# Patient Record
Sex: MALE | Race: WHITE | Employment: FULL TIME | URBAN - METROPOLITAN AREA
[De-identification: names, ages, dates, MRNs, and addresses within clinical notes are randomized per-mention and may not be internally consistent; named-entity substitution may affect disease eponyms.]

---

## 2020-01-10 ENCOUNTER — HOSPITAL ENCOUNTER (EMERGENCY)
Age: 55
Discharge: HOME | End: 2020-01-10
Attending: EMERGENCY MEDICINE
Payer: OTHER MISCELLANEOUS

## 2020-01-10 VITALS
WEIGHT: 220 LBS | SYSTOLIC BLOOD PRESSURE: 159 MMHG | TEMPERATURE: 97.9 F | HEART RATE: 59 BPM | BODY MASS INDEX: 28.23 KG/M2 | HEIGHT: 74 IN | DIASTOLIC BLOOD PRESSURE: 92 MMHG | RESPIRATION RATE: 14 BRPM | OXYGEN SATURATION: 100 %

## 2020-01-10 PROCEDURE — 99284 EMERGENCY DEPT VISIT MOD MDM: CPT

## 2020-01-10 RX ORDER — ROSUVASTATIN CALCIUM 10 MG/1
10 TABLET, COATED ORAL DAILY
COMMUNITY
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
## 2020-01-10 NOTE — ED NOTES
Patient given discharge instructions verbal and written, patient verbalized understanding. Alert/oriented X4, Clear speech.   Patient exhibits no distress, ambulates with steady gait per self leaving unit, no further request.     Helena Krueger RN  01/10/20 0425
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
## 2021-11-04 ENCOUNTER — OFFICE VISIT (OUTPATIENT)
Dept: URGENT CARE | Facility: CLINIC | Age: 56
End: 2021-11-04
Payer: COMMERCIAL

## 2021-11-04 ENCOUNTER — HOSPITAL ENCOUNTER (EMERGENCY)
Facility: HOSPITAL | Age: 56
Discharge: HOME | End: 2021-11-04
Attending: STUDENT IN AN ORGANIZED HEALTH CARE EDUCATION/TRAINING PROGRAM
Payer: COMMERCIAL

## 2021-11-04 ENCOUNTER — APPOINTMENT (OUTPATIENT)
Dept: CT IMAGING | Facility: HOSPITAL | Age: 56
End: 2021-11-04
Payer: COMMERCIAL

## 2021-11-04 VITALS
SYSTOLIC BLOOD PRESSURE: 140 MMHG | HEART RATE: 77 BPM | RESPIRATION RATE: 20 BRPM | DIASTOLIC BLOOD PRESSURE: 70 MMHG | TEMPERATURE: 99.8 F | OXYGEN SATURATION: 95 %

## 2021-11-04 VITALS
WEIGHT: 220 LBS | DIASTOLIC BLOOD PRESSURE: 83 MMHG | HEART RATE: 71 BPM | SYSTOLIC BLOOD PRESSURE: 141 MMHG | HEIGHT: 74 IN | RESPIRATION RATE: 20 BRPM | OXYGEN SATURATION: 94 % | TEMPERATURE: 97.8 F | BODY MASS INDEX: 28.23 KG/M2

## 2021-11-04 DIAGNOSIS — R10.9 ABDOMINAL PAIN, UNSPECIFIED ABDOMINAL LOCATION: Primary | ICD-10-CM

## 2021-11-04 DIAGNOSIS — K57.92 DIVERTICULITIS: Primary | ICD-10-CM

## 2021-11-04 LAB
ALBUMIN SERPL-MCNC: 4.8 G/DL (ref 3.5–5.3)
ALP SERPL-CCNC: 88 U/L (ref 45–115)
ALT SERPL-CCNC: 27 U/L (ref 7–52)
ANION GAP SERPL CALC-SCNC: 12 MMOL/L (ref 3–11)
AST SERPL-CCNC: 16 U/L
BACTERIA #/AREA URNS HPF: NORMAL /HPF
BASOPHILS # BLD AUTO: 0 10*3/UL
BASOPHILS NFR BLD AUTO: 0 % (ref 0–2)
BILIRUB SERPL-MCNC: 1.75 MG/DL (ref 0.2–1.4)
BILIRUB UR QL: NEGATIVE
BUN SERPL-MCNC: 12 MG/DL (ref 7–25)
CALCIUM ALBUM COR SERPL-MCNC: 9.5 MG/DL (ref 8.6–10.3)
CALCIUM SERPL-MCNC: 10.1 MG/DL (ref 8.6–10.3)
CHLORIDE SERPL-SCNC: 100 MMOL/L (ref 98–107)
CLARITY UR: CLEAR
CO2 SERPL-SCNC: 25 MMOL/L (ref 21–32)
COLOR UR: YELLOW
CREAT SERPL-MCNC: 0.89 MG/DL (ref 0.7–1.3)
EOSINOPHIL # BLD AUTO: 0 10*3/UL
EOSINOPHIL NFR BLD AUTO: 0 % (ref 0–3)
ERYTHROCYTE [DISTWIDTH] IN BLOOD BY AUTOMATED COUNT: 14.2 % (ref 11.5–15)
GFR SERPL CREATININE-BSD FRML MDRD: 94 ML/MIN/1.73M*2
GLUCOSE SERPL-MCNC: 106 MG/DL (ref 70–105)
GLUCOSE UR QL: NEGATIVE MG/DL
HCT VFR BLD AUTO: 45.3 % (ref 38–50)
HGB BLD-MCNC: 15.5 G/DL (ref 13.2–17.2)
HGB UR QL: ABNORMAL
KETONES UR-MCNC: 15 MG/DL
LEUKOCYTE ESTERASE UR QL STRIP: NEGATIVE
LYMPHOCYTES # BLD AUTO: 1.4 10*3/UL
LYMPHOCYTES NFR BLD AUTO: 10 % (ref 15–47)
MCH RBC QN AUTO: 29.5 PG (ref 29–34)
MCHC RBC AUTO-ENTMCNC: 34.3 G/DL (ref 32–36)
MCV RBC AUTO: 86 FL (ref 82–97)
MONOCYTES # BLD AUTO: 1.6 10*3/UL
MONOCYTES NFR BLD AUTO: 12 % (ref 5–13)
NEUTROPHILS # BLD AUTO: 11 10*3/UL
NEUTROPHILS NFR BLD AUTO: 78 % (ref 46–70)
NITRITE UR QL: NEGATIVE
PH UR: 5.5 PH
PLATELET # BLD AUTO: 154 10*3/UL (ref 130–350)
PMV BLD AUTO: 9.2 FL (ref 6.9–10.8)
POTASSIUM SERPL-SCNC: 3.8 MMOL/L (ref 3.5–5.1)
PROT SERPL-MCNC: 7.9 G/DL (ref 6–8.3)
PROT UR STRIP-MCNC: NEGATIVE MG/DL
RBC # BLD AUTO: 5.26 10*6/ΜL (ref 4.1–5.8)
RBC #/AREA URNS HPF: NORMAL /HPF
SODIUM SERPL-SCNC: 137 MMOL/L (ref 135–145)
SP GR UR: 1.01 (ref 1–1.03)
SQUAMOUS #/AREA URNS HPF: NORMAL /HPF
UROBILINOGEN UR-MCNC: 0.2 E.U./DL
WBC # BLD AUTO: 14.2 10*3/UL (ref 3.7–9.6)
WBC #/AREA URNS HPF: NORMAL /HPF

## 2021-11-04 PROCEDURE — 2550000100 HC RX 255: Performed by: STUDENT IN AN ORGANIZED HEALTH CARE EDUCATION/TRAINING PROGRAM

## 2021-11-04 PROCEDURE — 99284 EMERGENCY DEPT VISIT MOD MDM: CPT | Performed by: STUDENT IN AN ORGANIZED HEALTH CARE EDUCATION/TRAINING PROGRAM

## 2021-11-04 PROCEDURE — 2580000300 HC RX 258: Performed by: STUDENT IN AN ORGANIZED HEALTH CARE EDUCATION/TRAINING PROGRAM

## 2021-11-04 PROCEDURE — 74177 CT ABD & PELVIS W/CONTRAST: CPT | Mod: ME

## 2021-11-04 PROCEDURE — 36415 COLL VENOUS BLD VENIPUNCTURE: CPT | Performed by: STUDENT IN AN ORGANIZED HEALTH CARE EDUCATION/TRAINING PROGRAM

## 2021-11-04 PROCEDURE — 80053 COMPREHEN METABOLIC PANEL: CPT | Performed by: STUDENT IN AN ORGANIZED HEALTH CARE EDUCATION/TRAINING PROGRAM

## 2021-11-04 PROCEDURE — G1004 CDSM NDSC: HCPCS | Performed by: INTERNAL MEDICINE

## 2021-11-04 PROCEDURE — 81001 URINALYSIS AUTO W/SCOPE: CPT | Performed by: STUDENT IN AN ORGANIZED HEALTH CARE EDUCATION/TRAINING PROGRAM

## 2021-11-04 PROCEDURE — 6370000100 HC RX 637 (ALT 250 FOR IP): Performed by: STUDENT IN AN ORGANIZED HEALTH CARE EDUCATION/TRAINING PROGRAM

## 2021-11-04 PROCEDURE — 85025 COMPLETE CBC W/AUTO DIFF WBC: CPT | Performed by: STUDENT IN AN ORGANIZED HEALTH CARE EDUCATION/TRAINING PROGRAM

## 2021-11-04 PROCEDURE — 74177 CT ABD & PELVIS W/CONTRAST: CPT | Mod: 26,ME | Performed by: INTERNAL MEDICINE

## 2021-11-04 RX ORDER — AMOXICILLIN AND CLAVULANATE POTASSIUM 875; 125 MG/1; MG/1
1 TABLET, FILM COATED ORAL 2 TIMES DAILY
Qty: 20 TABLET | Refills: 0 | Status: SHIPPED | OUTPATIENT
Start: 2021-11-04 | End: 2021-11-14

## 2021-11-04 RX ORDER — ONDANSETRON HYDROCHLORIDE 2 MG/ML
4 INJECTION, SOLUTION INTRAVENOUS ONCE
Status: DISCONTINUED | OUTPATIENT
Start: 2021-11-04 | End: 2021-11-04 | Stop reason: HOSPADM

## 2021-11-04 RX ORDER — MORPHINE SULFATE 4 MG/ML
4 INJECTION, SOLUTION INTRAMUSCULAR; INTRAVENOUS ONCE
Status: DISCONTINUED | OUTPATIENT
Start: 2021-11-04 | End: 2021-11-04 | Stop reason: HOSPADM

## 2021-11-04 RX ORDER — ESCITALOPRAM OXALATE 10 MG/1
20 TABLET ORAL DAILY
COMMUNITY
End: 2021-11-04 | Stop reason: ENTERED-IN-ERROR

## 2021-11-04 RX ORDER — AMOXICILLIN AND CLAVULANATE POTASSIUM 875; 125 MG/1; MG/1
1 TABLET, FILM COATED ORAL ONCE
Status: COMPLETED | OUTPATIENT
Start: 2021-11-04 | End: 2021-11-04

## 2021-11-04 RX ORDER — IOPAMIDOL 755 MG/ML
140 INJECTION, SOLUTION INTRAVASCULAR ONCE
Status: COMPLETED | OUTPATIENT
Start: 2021-11-04 | End: 2021-11-04

## 2021-11-04 RX ORDER — SODIUM CHLORIDE 9 MG/ML
1000 INJECTION, SOLUTION INTRAVENOUS ONCE
Status: COMPLETED | OUTPATIENT
Start: 2021-11-04 | End: 2021-11-04

## 2021-11-04 RX ORDER — HYDROCODONE BITARTRATE AND ACETAMINOPHEN 5; 325 MG/1; MG/1
1 TABLET ORAL EVERY 6 HOURS PRN
COMMUNITY

## 2021-11-04 RX ADMIN — IOPAMIDOL 140 ML: 755 INJECTION, SOLUTION INTRAVENOUS at 09:55

## 2021-11-04 RX ADMIN — AMOXICILLIN AND CLAVULANATE POTASSIUM 1 TABLET: 875; 125 TABLET, FILM COATED ORAL at 11:44

## 2021-11-04 RX ADMIN — SODIUM CHLORIDE 1000 ML: 9 INJECTION, SOLUTION INTRAVENOUS at 09:29

## 2021-11-04 ASSESSMENT — PAIN SCALES - GENERAL: PAINLEVEL: 5

## 2021-11-04 ASSESSMENT — PAIN DESCRIPTION - DESCRIPTORS: DESCRIPTORS: ACHING

## 2021-11-04 ASSESSMENT — ENCOUNTER SYMPTOMS: ABDOMINAL PAIN: 1

## 2021-11-04 NOTE — DISCHARGE INSTRUCTIONS
-You will receive your first dose of antibiotics here in the emergency department I recommend you begin taking your antibiotics this evening as directed.  -Recommend a bland soft diet for the first several days to help with intestinal healing    -Thanks for letting me participate in your health care today.  -I do not expect your overall condition to worsen.  So if you begin experiencing any worsening symptoms, or other significant changes please seek medical re-evaluation.    -I recommend outpatient follow-up with your PCP, or another local provider.  -Thank you again and have a great day.

## 2021-11-04 NOTE — PROGRESS NOTES
Subjective      Gael Roman is a 56 y.o. male who presents for abdominal pain.    No services.  Patient triaged and sent to the ER.    Per nursing note:  Pt reports severe left quadrant abdominal pain.  Pt reports pain 100X worse than diverticulitis.  Pt reports possible reaction cipro and flagyl.  Temperature last night.  No meds taken.  HX appy-- Report given to ED            The following have been reviewed and updated as appropriate in this visit:         Review of Systems   Gastrointestinal: Positive for abdominal pain.       Objective   /70   Pulse 77   Temp 37.7 °C (99.8 °F) (Temporal)   Resp 20   SpO2 95%     Physical Exam  Constitutional:       Comments: No services.         Assessment/Plan   Diagnoses and all orders for this visit:    Abdominal pain, unspecified abdominal location    No services.  Patient triaged and sent to the ER.    Per nursing note:  Pt reports severe left quadrant abdominal pain.  Pt reports pain 100X worse than diverticulitis.  Pt reports possible reaction cipro and flagyl.  Temperature last night.  No meds taken.  HX appy-- Report given to ED        KARLENE FRANKS

## 2021-11-04 NOTE — ED PROVIDER NOTES
Emergency Room Provider Note    ID/CC:  Chief Complaint   Patient presents with   • Abdominal Pain     left lower abd pain, x 2 days, hx diverticulitis, no n/v/d, chills, last pm        HISTORY OF PRESENT ILLNESS:  Patient is a 56 y.o. male who presents to the ED for left-sided abdominal pain.  Patient reports her last couple days has had worsening left-sided avelina pain particularly in the left lower and left upper quadrants.  Reports that he felt like he had a fever last night.  He is traveling from the area and was out hunting and did not have a thermometer.  He does report he had a couple episodes of diarrhea but no vomiting.  No chest pain or shortness of breath reported.  Patient does report some pain when he takes in a deep breath particularly in the left upper abdomen.  Patient reports that he has a history of diverticulitis.  No other associated symptoms reported at this time.      PAST MEDICAL/SURGICAL HISTORY:  Past Medical History:   Diagnosis Date   • Hypercholesteremia        History reviewed. No pertinent surgical history.    MEDICATIONS:    Current Outpatient Medications:   •  HYDROcodone-acetaminophen (NORCO) 5-325 mg per tablet, Take 1 tablet by mouth every 6 (six) hours as needed, Disp: , Rfl:   •  amoxicillin-pot clavulanate (AUGMENTIN) 875-125 mg per tablet, Take 1 tablet by mouth 2 (two) times a day for 10 days, Disp: 20 tablet, Rfl: 0     ALLERGIES:   Allergies   Allergen Reactions   • Ciprofloxacin Hives   • Metronidazole Hives       FAMILY HISTORY:  History reviewed. No pertinent family history.    REVIEW OF SYSTEMS:  ROS performed otherwise negative except was noted in the [facility]    VITALS  ED Triage Vitals [11/04/21 0856]   Temp Heart Rate Resp BP SpO2   36.6 °C (97.8 °F) 78 20 142/95 97 %      Temp Source Heart Rate Source Patient Position BP Location FiO2 (%)   Oral -- -- -- --          PHYSICAL EXAM  GENERAL: Patient is a 56 y.o. old male who appears to be in moderate distress  HEENT:  EOMI. no facial deformities.  CARDIO: RRR. No murmurs appreciated  RESP: No respiratory distress.  No wheezing noted.  GI: Hyperactive bowel sounds.  Abdomen is slightly distended but soft.  Tenderness particularly in the left upper lateral quadrant.  NEURO: Patient alert and awake.  No facial droop noted.  Facial musculature intact.  MSK: Patient moving limbs without difficulty.   DERM: Skin warm and dry.   PSYCH: No depression noted.  Patient does not appear anxious.    LABS  Labs Reviewed   CBC WITH AUTO DIFFERENTIAL - Abnormal       Result Value    WBC 14.2 (*)     RBC 5.26      Hemoglobin 15.5      Hematocrit 45.3      MCV 86.0      MCH 29.5      MCHC 34.3      RDW 14.2      Platelets 154      MPV 9.2      Neutrophils% 78 (*)     Lymphocytes% 10 (*)     Monocytes% 12      Eosinophils% 0      Basophils% 0      ANC (auto diff) 11.00      Lymphocytes Absolute 1.40      Monocytes Absolute 1.60      Eosinophils Absolute 0.00      Basophils Absolute 0.00     COMPREHENSIVE METABOLIC PANEL - Abnormal    Sodium 137      Potassium 3.8      Chloride 100      CO2 25      Anion Gap 12 (*)     BUN 12      Creatinine 0.89      Glucose 106 (*)     Calcium 10.1      AST 16      ALT (SGPT) 27      Alkaline Phosphatase 88      Total Protein 7.9      Albumin 4.8      Total Bilirubin 1.75 (*)     eGFR 94      Corrected Calcium 9.5      Narrative:     Estimated GFR calculated using the 2009 CKD-EPI creatinine equation.   URINALYSIS, DIPSTICK ONLY, FOR USE WITH MICROSCOPIC PANEL - Abnormal    Color, Urine Yellow      Clarity, Urine Clear      Specific Gravity, Urine 1.015      Leukocytes, Urine Negative      Nitrite, Urine Negative      Protein, Urine Negative      Ketones, Urine 15  (*)     Urobilinogen, Urine 0.2      Bilirubin, Urine Negative      Blood, Urine Trace-intact (*)     Glucose, Urine Negative      pH, Urine 5.5     URINALYSIS, MICROSCOPIC ONLY - Normal    RBC, Urine 0-2      WBC, Urine 0-4      Squamous Epithelial,  Urine 0-4      Bacteria, Urine Few     URINALYSIS WITH MICROSCOPIC    Narrative:     The following orders were created for panel order Urinalysis w/microscopic Urine, Clean Catch.  Procedure                               Abnormality         Status                     ---------                               -----------         ------                     Urinalysis, microscopic U...[[id number]]  Normal              Final result               Urinalysis, dipstick Urin...[[id number]]  Abnormal            Final result                 Please view results for these tests on the individual orders.       IMAGING  CT ABDOMEN PELVIS W IV CONTRAST   Final Result   IMPRESSION:   1.  Acute sigmoid diverticulitis. No evidence of perforation or abscess formation.   2.  Hepatic steatosis.             MEDICATIONS GIVEN  Medications   morphine injection 4 mg (4 mg intravenous Not Given 11/4/21 0925)   ondansetron (ZOFRAN) injection 4 mg (4 mg intravenous Not Given 11/4/21 0925)   amoxicillin-pot clavulanate (AUGMENTIN) 875-125 mg per tablet 1 tablet (has no administration in time range)   sodium chloride 0.9 % bolus 1,000 mL (1,000 mL intravenous New Bag/New Syringe 11/4/21 0929)   iopamidoL (ISOVUE-370) 76 % injection 140 mL (140 mL intravenous Given 11/4/21 0955)       PROCEDURES  Procedures    ED Course as of Nov 04 1118   Thu Nov 04, 2021   0958 Urinalysis does show trace amount of blood and ketones. Otherwise urinalysis within normal limits. Patient does have a slight white count of 14.2.    [JT]   1105 CMP shows a slight increase in his anion gap, no electrolyte abnormalities.  Total bili is slightly elevated but normal LFTs.  Patient does not appear jaundiced.  Will recommend outpatient follow-up.    [JT]   1106 CT scan showed signs consistent with a diverticulitis.    [JT]      ED Course User Index  [JT] Edson Logan DO       MDM:  Patient is a 56 y.o. male who presented to the ED and the patients medical record and history was  reviewed.  Work-up was done emergency department and found that the patient had acute diverticulitis without abscess formation or perforation.  Patient unfortunately has a reaction to both ciprofloxacin metronidazole.  At this time we will begin a course of Augmentin twice daily.  Patient was otherwise stable and did not appear toxic appearing and did not need hospital admission or further management from the inpatient setting.  However education was given on what to monitor for and when to return back to emergency department.  Regards the patient's elevated total bilirubin, unsure if this is new or chronic.  Given that he has no signs of right upper quadrant pain I do not believe this is gallbladder or liver related directly.  LFTs were normal.  Will recommend that patient follow-up with his primary care doc and follow-up on this lab value.  He expressed understanding to that as well.    I did advise the patient that I do not expect that there symptoms to worsen.  If they do or change in any way, or if they have any concerns that they need to come back to the emergency room for further evaluation.  I also strongly encouraged outpatient follow-up.    CLINICAL IMPRESSION  Final diagnoses:   [K57.92] Diverticulitis                  Edson Logan,   11/04/21 1118

## 2021-11-04 NOTE — PROGRESS NOTES
Pt reports severe left quadrant abdominal pain.  Pt reports pain 100X worse than diverticulitis.  Pt reports possible reaction cipro and flagyl.  Temperature last night.  No meds taken.  HX appy-- Report given to ED

## 2021-11-04 NOTE — PATIENT INSTRUCTIONS
No services.  Patient triaged and sent to the ER.    Per nursing note:  Pt reports severe left quadrant abdominal pain.  Pt reports pain 100X worse than diverticulitis.  Pt reports possible reaction cipro and flagyl.  Temperature last night.  No meds taken.  HX appy-- Report given to ED